# Patient Record
Sex: FEMALE | Race: WHITE
[De-identification: names, ages, dates, MRNs, and addresses within clinical notes are randomized per-mention and may not be internally consistent; named-entity substitution may affect disease eponyms.]

---

## 2018-08-08 ENCOUNTER — HOSPITAL ENCOUNTER (OUTPATIENT)
Dept: HOSPITAL 89 - LAB | Age: 33
End: 2018-08-08
Attending: OTOLARYNGOLOGY
Payer: MEDICAID

## 2018-08-08 VITALS — BODY MASS INDEX: 44.96 KG/M2

## 2018-08-08 DIAGNOSIS — E04.9: ICD-10-CM

## 2018-08-08 DIAGNOSIS — E03.9: Primary | ICD-10-CM

## 2018-08-08 PROCEDURE — 86800 THYROGLOBULIN ANTIBODY: CPT

## 2018-08-08 PROCEDURE — 36415 COLL VENOUS BLD VENIPUNCTURE: CPT

## 2018-08-08 PROCEDURE — 86376 MICROSOMAL ANTIBODY EACH: CPT

## 2018-08-08 PROCEDURE — 84439 ASSAY OF FREE THYROXINE: CPT

## 2018-08-08 PROCEDURE — 84443 ASSAY THYROID STIM HORMONE: CPT

## 2018-08-10 ENCOUNTER — HOSPITAL ENCOUNTER (OUTPATIENT)
Dept: HOSPITAL 89 - US | Age: 33
End: 2018-08-10
Attending: OTOLARYNGOLOGY
Payer: MEDICAID

## 2018-08-10 VITALS — BODY MASS INDEX: 44.96 KG/M2

## 2018-08-10 DIAGNOSIS — E04.9: Primary | ICD-10-CM

## 2018-08-10 DIAGNOSIS — E03.9: ICD-10-CM

## 2018-08-10 PROCEDURE — 76536 US EXAM OF HEAD AND NECK: CPT

## 2018-08-11 NOTE — RADIOLOGY IMAGING REPORT
FACILITY: [facility] [place] 

 

PATIENT NAME: Kalpana Peterson

: 1985

MR: [id number]

V: [id number]

EXAM DATE: 

ORDERING PHYSICIAN: SELINA TALAMANTES

TECHNOLOGIST: 

 

Location: [facility]

Patient: Kalpana Peterson

: 1985

MRN: [id number]

Visit/Account:[id number]

Date of Sevice:  8/10/2018

 

ACCESSION #: [id number]

 

Ultrasound of the thyroid:

 

Indication: Hypothyroidism.

Technique: Routine imaging, with limited Doppler.

Comparison: None.

 

Right lobe: Normal in size, measuring 8.5 x 1.1 x 1.1 cm. There is uniform parenchymal echogenicity. 
There are no signs of nodule, cyst, or calcification.

 

Isthmus: Measures 3 mm and appears unremarkable.

 

Left lobe: Normal in size, measuring 3.7 x 1.4 x 0.9 cm. In the midportion, there is a tiny well-circ
umscribed, partially cystic nodule, measuring 0.5 x 0.5 x 0.4 cm. No calcification or hypervascularit
y is observed. There is a very low suspicion for malignancy.

 

IMPRESSION: The thyroid is normal in overall size. There is a tiny partially cystic nodule in the lef
t lobe. No other focal thyroid lesions are identified.

 

 

REFERENCE:

2015 American Thyroid Association Management Guidelines for Adult Patients with Thyroid Nodules and D
ifferentiated Thyroid Cancer: The American Thyroid Association Guidelines Task Force on Thyroid Nodul
es and Differentiated Thyroid Cancer.

 

SONOGRAPHIC PATTERNS:

*  Benign: Purely cystic nodules (no solid component); estimated risk of malignancy <1 percent; no bi
opsy recommended.

*  Very Low Suspicion: Spongiform or partially cystic nodules without any of the sonographic features
 described in low, intermediate, or high suspicion patterns; estimated risk of malignancy <3 percent;
 consider FNA at > 2 cm (Observation without FNA is also a reasonable option).

*  Low Suspicion: Isoechoic or hyperechoic solid nodule, or partially cystic nodule with eccentric so
lid areas, without microcalcification, irregular margin or ETE (extra-thyroidal extension), or taller
 than wide shape; estimated risk of malignancy 5-10 percent; recommend FNA at >1.5 cm.

*  Intermediate Suspicion: Hypoechoic solid nodule with smooth margins without microcalcifications, E
TE (extra-thyroidal extension), or taller than wide shape; estimated risk of malignancy 10-20 percent
; recommend FNA at > 1 cm.

*  High Suspicion: Solid hypoechoic nodule or solid hypoechoic component of a partially cystic nodule
 with one or more of the following features: irregular margins (infiltrative, microlobulated), microc
alcifications, taller than wide shape, rim calcifications with small extrusive soft tissue component,
 evidence of ETE (extra-thyroidal extension); estimated risk of malignancy >70-90 percent; recommend 
FNA at > 1 cm.

 

NOTES:

*  Although a sonographically suspicious subcentimeter thyroid nodule without evidence of extrathyroi
melida extension or sonographically suspicious lymph nodes may be observed with close sonographic follow
-up rather than pursuing immediate FNA, patient age and preference may modify decision-making.

*  A > 50% interval increase in nodule volume and/or development of new suspicious sonographic featur
es are felt to be a valid reasons for potential re-aspiration of a nodule previously shown to have be
nign FNA cytology.

 

Report Dictated By: Ruben Egan MD at 2018 4:11 AM

 

Report E-Signed By: Ruben Egan MD  at 2018 4:43 AM

 

WSN:[id number]